# Patient Record
Sex: MALE
[De-identification: names, ages, dates, MRNs, and addresses within clinical notes are randomized per-mention and may not be internally consistent; named-entity substitution may affect disease eponyms.]

---

## 2022-01-14 PROBLEM — Z00.00 ENCOUNTER FOR PREVENTIVE HEALTH EXAMINATION: Status: ACTIVE | Noted: 2022-01-14

## 2022-06-08 ENCOUNTER — NON-APPOINTMENT (OUTPATIENT)
Age: 47
End: 2022-06-08

## 2022-06-08 ENCOUNTER — APPOINTMENT (OUTPATIENT)
Dept: CARDIOLOGY | Facility: CLINIC | Age: 47
End: 2022-06-08
Payer: SELF-PAY

## 2022-06-08 VITALS
HEIGHT: 65.75 IN | OXYGEN SATURATION: 99 % | HEART RATE: 84 BPM | BODY MASS INDEX: 39.36 KG/M2 | WEIGHT: 242 LBS | DIASTOLIC BLOOD PRESSURE: 85 MMHG | SYSTOLIC BLOOD PRESSURE: 135 MMHG

## 2022-06-08 PROCEDURE — 99204 OFFICE O/P NEW MOD 45 MIN: CPT

## 2022-06-08 PROCEDURE — 93000 ELECTROCARDIOGRAM COMPLETE: CPT

## 2022-06-13 RX ORDER — GLYBURIDE 2.5 MG/1
TABLET ORAL
Refills: 0 | Status: ACTIVE | COMMUNITY

## 2022-06-13 NOTE — REVIEW OF SYSTEMS
[Fever] : no fever [Blurry Vision] : no blurred vision [Earache] : no earache [Leg Claudication] : no intermittent leg claudication [Cough] : no cough [Abdominal Pain] : no abdominal pain

## 2022-06-13 NOTE — PHYSICAL EXAM
[Well Developed] : well developed [Frail] : frail [Normal Conjunctiva] : normal conjunctiva [Normal Venous Pressure] : normal venous pressure [No Carotid Bruit] : no carotid bruit [Normal S1, S2] : normal S1, S2 [S3] : S3

## 2022-06-13 NOTE — DISCUSSION/SUMMARY
[FreeTextEntry1] : Type 2 DM: fu PCP\par \par CV screening: At this time she is asymptomatic and does not have any symptoms that warrant further testing. I recommend she address with her PCP about lipids, DM and adopts healthy diet/ exercise habits.